# Patient Record
Sex: MALE | Race: WHITE | URBAN - METROPOLITAN AREA
[De-identification: names, ages, dates, MRNs, and addresses within clinical notes are randomized per-mention and may not be internally consistent; named-entity substitution may affect disease eponyms.]

---

## 2023-11-21 ENCOUNTER — OFFICE VISIT (OUTPATIENT)
Facility: CLINIC | Age: 38
End: 2023-11-21
Payer: COMMERCIAL

## 2023-11-21 ENCOUNTER — TELEPHONE (OUTPATIENT)
Facility: CLINIC | Age: 38
End: 2023-11-21

## 2023-11-21 VITALS
DIASTOLIC BLOOD PRESSURE: 72 MMHG | OXYGEN SATURATION: 99 % | SYSTOLIC BLOOD PRESSURE: 110 MMHG | HEART RATE: 78 BPM | WEIGHT: 254 LBS | HEIGHT: 78 IN | RESPIRATION RATE: 18 BRPM | BODY MASS INDEX: 29.39 KG/M2

## 2023-11-21 DIAGNOSIS — R79.89 ELEVATED PROLACTIN LEVEL: ICD-10-CM

## 2023-11-21 DIAGNOSIS — E29.1 HYPOGONADISM IN MALE: Primary | ICD-10-CM

## 2023-11-21 PROCEDURE — 3078F DIAST BP <80 MM HG: CPT | Performed by: STUDENT IN AN ORGANIZED HEALTH CARE EDUCATION/TRAINING PROGRAM

## 2023-11-21 PROCEDURE — 3074F SYST BP LT 130 MM HG: CPT | Performed by: STUDENT IN AN ORGANIZED HEALTH CARE EDUCATION/TRAINING PROGRAM

## 2023-11-21 PROCEDURE — 3008F BODY MASS INDEX DOCD: CPT | Performed by: STUDENT IN AN ORGANIZED HEALTH CARE EDUCATION/TRAINING PROGRAM

## 2023-11-21 PROCEDURE — 99204 OFFICE O/P NEW MOD 45 MIN: CPT | Performed by: STUDENT IN AN ORGANIZED HEALTH CARE EDUCATION/TRAINING PROGRAM

## 2023-11-21 RX ORDER — MULTIVITAMIN
TABLET ORAL
COMMUNITY

## 2023-11-21 NOTE — TELEPHONE ENCOUNTER
RN received note from Dr. Meek Rao reading:    \"I saw Yahir Armendariz this AM. They faxed over additional labs from their previous provider and I saw that his testosterone was again low, but it is missing the other labs I wanted. I have ordered those labs to assess his pituitary function and he needs to have them done 8AM and fasting when he gets the chance. Depending on those results, we'll discuss a brain MRI. In reviewing his original labs as well, I saw his RUPINDER titer is quite positive so a rheumatology evaluation may be a good idea as this may be contributing to his joint pain and can be separate from the testosterone issue - they can discuss with their PCP. \"    RN phoned pt; pt didn't answer. RN will try patient again later on today. Pt's MyChart status is \"pending\".

## 2023-11-21 NOTE — TELEPHONE ENCOUNTER
Pt's wife, Jessie Felder, phoned RN. Yodit's correct phone is # [phone]. Pt's wife was notified of Dr. Dahiana Carpio orders/response:    \"I saw Kalyan Bansal this AM. They faxed over additional labs from their previous provider and I saw that his testosterone was again low, but it is missing the other labs I wanted. I have ordered those labs to assess his pituitary function and he needs to have them done 8AM and fasting when he gets the chance. Depending on those results, we'll discuss a brain MRI. In reviewing his original labs as well, I saw his RUPINDER titer is quite positive so a rheumatology evaluation may be a good idea as this may be contributing to his joint pain and can be separate from the testosterone issue - they can discuss with their PCP\". Yodit verbalizes understanding of all the above. Wife stated that pt has an US scrotum scheduled for this Saturday, 11/25/23. Will close encounter.

## 2023-11-21 NOTE — PATIENT INSTRUCTIONS
Your testosterone is clearly low, it will need to be replaced - but we have to wait until your fertility evaluation is done. Your prolactin and a few pituitary hormones were a little off - please have your office fax over their remaining lab tests so I can check if any other labs need to be done. We'll have you do the testicular ultrasound to make sure there are no abnormalities of the testes themselves. We will have you see the reproductive endocrinologist to discuss Clomid/hCG since that has to come from their office.      Return Visit   [X] Physician in 4 weeks  [X] Video    [X] After visit summary   [X] Central scheduling # for ultrasound

## 2023-11-21 NOTE — TELEPHONE ENCOUNTER
RN phoned pt; before RN could start relaying Dr. Dmitriy Galo message, pt stated that he was at work. Pt requested that RN phone his wife instead. Wife's phone #[phone]. RN phoned number pt gave to call wife and number was incorrect. RN will try to obtain correct phone number.